# Patient Record
Sex: MALE | Race: WHITE | NOT HISPANIC OR LATINO | Employment: FULL TIME | URBAN - METROPOLITAN AREA
[De-identification: names, ages, dates, MRNs, and addresses within clinical notes are randomized per-mention and may not be internally consistent; named-entity substitution may affect disease eponyms.]

---

## 2018-11-08 ENCOUNTER — TELEPHONE (OUTPATIENT)
Dept: FAMILY MEDICINE | Facility: CLINIC | Age: 58
End: 2018-11-08

## 2018-11-08 NOTE — TELEPHONE ENCOUNTER
11/8/2018    Call Regarding ReattributionPhysical    Attempt 1    Message on voicemail     Comments:       Outreach   BETHEL

## 2019-08-13 ENCOUNTER — OFFICE VISIT (OUTPATIENT)
Dept: FAMILY MEDICINE | Facility: CLINIC | Age: 59
End: 2019-08-13
Payer: COMMERCIAL

## 2019-08-13 VITALS
SYSTOLIC BLOOD PRESSURE: 131 MMHG | HEART RATE: 61 BPM | BODY MASS INDEX: 22.73 KG/M2 | OXYGEN SATURATION: 97 % | DIASTOLIC BLOOD PRESSURE: 87 MMHG | WEIGHT: 150 LBS | TEMPERATURE: 98 F | HEIGHT: 68 IN

## 2019-08-13 DIAGNOSIS — Z00.00 ROUTINE GENERAL MEDICAL EXAMINATION AT A HEALTH CARE FACILITY: Primary | ICD-10-CM

## 2019-08-13 DIAGNOSIS — Z23 NEED FOR VACCINATION: ICD-10-CM

## 2019-08-13 PROCEDURE — 90471 IMMUNIZATION ADMIN: CPT | Performed by: PHYSICIAN ASSISTANT

## 2019-08-13 PROCEDURE — 90715 TDAP VACCINE 7 YRS/> IM: CPT | Performed by: PHYSICIAN ASSISTANT

## 2019-08-13 PROCEDURE — 99386 PREV VISIT NEW AGE 40-64: CPT | Mod: 25 | Performed by: PHYSICIAN ASSISTANT

## 2019-08-13 ASSESSMENT — MIFFLIN-ST. JEOR: SCORE: 1474.9

## 2019-08-13 NOTE — NURSING NOTE
"Chief Complaint   Patient presents with     Physical     /87   Pulse 61   Temp 98  F (36.7  C) (Oral)   Ht 1.727 m (5' 8\")   Wt 68 kg (150 lb)   SpO2 97%   BMI 22.81 kg/m   Estimated body mass index is 22.81 kg/m  as calculated from the following:    Height as of this encounter: 1.727 m (5' 8\").    Weight as of this encounter: 68 kg (150 lb).  Medication Reconciliation: complete      Health Maintenance that is potentially due pending provider review:  NONE    n/a    ALLA Perales  "

## 2019-08-13 NOTE — PROGRESS NOTES
SUBJECTIVE:   CC: Imtiaz Ramos is an 59 year old male who presents for preventative health visit.     Healthy Habits:     Getting at least 3 servings of Calcium per day:  Yes    Bi-annual eye exam:  NO    Dental care twice a year:  Yes    Sleep apnea or symptoms of sleep apnea:  None    Diet:  Other    Frequency of exercise:  4-5 days/week    Duration of exercise:  30-45 minutes    Taking medications regularly:  Not Applicable    Medication side effects:  Not applicable    PHQ-2 Total Score: 0    Additional concerns today:  No      60 y/o male considered NP male here for well exam.  It has been over 3 years since we have seen last.  He overall feels great, has not needed any medical intervention in that time.  He exercises consistently, eats clean diet.  Energy is good, sleeps great.  Work is going well.        Today's PHQ-2 Score:   PHQ-2 ( 1999 [employer]) 8/13/2019   Q1: Little interest or pleasure in doing things 0   Q2: Feeling down, depressed or hopeless 0   PHQ-2 Score 0   Q1: Little interest or pleasure in doing things Not at all   Q2: Feeling down, depressed or hopeless Not at all   PHQ-2 Score 0       Abuse: Current or Past(Physical, Sexual or Emotional)- No  Do you feel safe in your environment? Yes    Social History     Tobacco Use     Smoking status: Never Smoker     Smokeless tobacco: Never Used   Substance Use Topics     Alcohol use: Yes     Comment: occassionally         Alcohol Use 8/13/2019   Prescreen: >3 drinks/day or >7 drinks/week? No   Prescreen: >3 drinks/day or >7 drinks/week? -   No flowsheet data found.    Last PSA: No results found for: PSA    Reviewed orders with patient. Reviewed health maintenance and updated orders accordingly - Yes  BP Readings from Last 3 Encounters:   08/13/19 131/87   07/18/16 136/80   06/26/14 104/66    Wt Readings from Last 3 Encounters:   08/13/19 68 kg (150 lb)   07/18/16 71.7 kg (158 lb)   06/26/14 67.9 kg (149 lb 11.2 oz)                    Reviewed and updated  "as needed this visit by clinical staff  Tobacco  Allergies  Meds         Reviewed and updated as needed this visit by Provider            Review of Systems  CONSTITUTIONAL: NEGATIVE for fever, chills, change in weight  INTEGUMENTARY/SKIN: NEGATIVE for worrisome rashes, moles or lesions  EYES: NEGATIVE for vision changes or irritation  ENT: NEGATIVE for ear, mouth and throat problems  RESP: NEGATIVE for significant cough or SOB  CV: NEGATIVE for chest pain, palpitations or peripheral edema  GI: NEGATIVE for nausea, abdominal pain, heartburn, or change in bowel habits   male: negative for dysuria, hematuria, decreased urinary stream, erectile dysfunction, urethral discharge  MUSCULOSKELETAL: NEGATIVE for significant arthralgias or myalgia  NEURO: NEGATIVE for weakness, dizziness or paresthesias  PSYCHIATRIC: NEGATIVE for changes in mood or affect    OBJECTIVE:   /87   Pulse 61   Temp 98  F (36.7  C) (Oral)   Ht 1.727 m (5' 8\")   Wt 68 kg (150 lb)   SpO2 97%   BMI 22.81 kg/m      Physical Exam  GENERAL: alert and no distress  EYES: Eyes grossly normal to inspection, PERRL and conjunctivae and sclerae normal  HENT: ear canals and TM's normal, nose and mouth without ulcers or lesions  NECK: no adenopathy, no asymmetry, masses, or scars and thyroid normal to palpation  RESP: lungs clear to auscultation - no rales, rhonchi or wheezes  CV: regular rate and rhythm, normal S1 S2, no S3 or S4, no murmur, click or rub, no peripheral edema and peripheral pulses strong  ABDOMEN: soft, nontender, no hepatosplenomegaly, no masses and bowel sounds normal  MS: no gross musculoskeletal defects noted, no edema  SKIN: no suspicious lesions or rashes  NEURO: Normal strength and tone, mentation intact and speech normal  PSYCH: mentation appears normal, affect normal/bright    Diagnostic Test Results:  Labs reviewed in Epic    ASSESSMENT/PLAN:   1. Routine general medical examination at a health care facility  Doing very " "well.  Discussed updating labs, but he did decline today.    2. Need for vaccination  Discussed shingles vaccine, he is going to look into coverage.  - TDAP VACCINE  - ADMIN 1st VACCINE    COUNSELING:   Reviewed preventive health counseling, as reflected in patient instructions       Regular exercise       Healthy diet/nutrition       Immunizations    Vaccinated for: TDAP             Colon cancer screening       Prostate cancer screening    Estimated body mass index is 22.81 kg/m  as calculated from the following:    Height as of this encounter: 1.727 m (5' 8\").    Weight as of this encounter: 68 kg (150 lb).          reports that he has never smoked. He has never used smokeless tobacco.      Counseling Resources:  ATP IV Guidelines  Pooled Cohorts Equation Calculator  FRAX Risk Assessment  ICSI Preventive Guidelines  Dietary Guidelines for Americans, 2010  USDA's MyPlate  ASA Prophylaxis  Lung CA Screening    Charbel Fitzgerald PA-C  [facility]  "

## 2019-09-16 DIAGNOSIS — E78.5 HYPERLIPIDEMIA LDL GOAL <100: Primary | ICD-10-CM

## 2019-09-19 ENCOUNTER — OFFICE VISIT (OUTPATIENT)
Dept: CARDIOLOGY | Facility: CLINIC | Age: 59
End: 2019-09-19
Payer: COMMERCIAL

## 2019-09-19 VITALS
BODY MASS INDEX: 22.55 KG/M2 | DIASTOLIC BLOOD PRESSURE: 81 MMHG | SYSTOLIC BLOOD PRESSURE: 128 MMHG | HEART RATE: 50 BPM | OXYGEN SATURATION: 99 % | WEIGHT: 148.3 LBS

## 2019-09-19 DIAGNOSIS — E78.5 HYPERLIPIDEMIA LDL GOAL <100: ICD-10-CM

## 2019-09-19 DIAGNOSIS — E78.5 HYPERLIPIDEMIA LDL GOAL <160: Primary | ICD-10-CM

## 2019-09-19 LAB
CHOLEST SERPL-MCNC: 199 MG/DL
CREAT UR-MCNC: 175 MG/DL
CRP SERPL HS-MCNC: 0.7 MG/L
FEF 25/75: NORMAL
FEV-1: NORMAL
FEV1/FVC: NORMAL
FVC: NORMAL
GLUCOSE SERPL-MCNC: 84 MG/DL (ref 70–99)
HDLC SERPL-MCNC: 42 MG/DL
LDLC SERPL CALC-MCNC: 136 MG/DL
MICROALBUMIN UR-MCNC: 10 MG/L
MICROALBUMIN/CREAT UR: 5.94 MG/G CR (ref 0–17)
NONHDLC SERPL-MCNC: 157 MG/DL
NT-PROBNP SERPL-MCNC: 33 PG/ML (ref 0–125)
TRIGL SERPL-MCNC: 102 MG/DL

## 2019-09-19 NOTE — LETTER
9/19/2019      RE: Imtiaz Ramos  [id number]  [facility] [id number]       Dear Colleague,    Thank you for the opportunity to participate in the care of your patient, Imtiaz Ramos, at the [facility] FOR CARDIOVASCULAR DISEASE PREVENTION at [facility]. Please see a copy of my visit note below.    [facility] for Cardiovascular Disease Prevention - Exam Note    Active Problems   Patient Active Problem List    Diagnosis Date Noted     Advanced directives, counseling/discussion 07/18/2016     Priority: Medium     Pt will bring copy to clinic 07/18/2016       Hyperlipidemia LDL goal <160 06/05/2012     Priority: Medium       Reason For Visit   Patient here for [facility] early detection of atherosclerosis and CVD exam.    Pain Evaluation  Current history of pain associated with this visit is: denied    HPI   Imtiaz Ramos is a 59 year old year old male with a history of hyperlipidemia.  Hyperlipidemia was identified at age 46.  His highest level of LDL has been 170.  He has been seen in the [facility] CV Prevention Clinic in 2007 and 12/2/13.  His score for the Prevention Test Results Summary in 2013 was 2.    1 point for arterial elasticity and 1 point for the retinal photo.  In 2007 his Prevention Test Results Summary was 1 for borderline retinal photo results.  He has never been on a statin medication for hyperlipidemia.  He denies CP at rest or with exercise, SOB at rest or with activity, lower leg edema, heartburn, HA or calf pain.  He has lost approximately 12 pounds this past year due to change in his exercise routine and loss of muscle mass.  He returns to clinic today for routine follow up.      Nutrition assessment per patient report:   Foods with fat/cholesterol (fried foods, fatty meats, junk food):  <1/day french fries on occasion   Fruits and vegetables (  cup cooked, 1 cup raw):  2-3/day vegetables, fruit 1-2/day  Caffeine (1 cup coffee, soda,  etc):  1 cup of coffee in am  Alcohol servings (12 oz. beer, 4 oz. wine, 1  oz. in mixed drink):  2-3 glasses of wine/week  Calcium servings (dairy foods, 8 oz. milk, yogurt, cheese, ice cream):  cheese 3x/week  Salt/sodium use:  no  Special dietary habits:  no red meat, eats chicken and fish    Activity  Patient is active 5x/wee times per week for 30-90 minutes with running and [employer] arts.  He is planning to run a 5 mile run over Thanksgiving.  .  Last year he worked out in gymnastics and cross fit.      Laboratory Results Review  We discussed laboratory results today including lipids targets and how foods influence cholesterol.    Weight  His perceived healthy weight is 150 pounds.  A normal BMI of 25 is equal to 164 pounds.  The current BMI of 23 is normal weight range.    PMH   Past Medical History:   Diagnosis Date     Cardiovascular risk factor     Gutierrez early CVD score  with 0 all tests normal, 20 all abnormal     Hyperlipidaemia age 46    -170's     Skin cancer, basal cell     resected     White coat hypertension     systolic in the 200's then low in 110's.       PSH  Past Surgical History:   Procedure Laterality Date     COLONOSCOPY  2012    Procedure: COLONOSCOPY;;  Surgeon: Mag Mcdonald MD;  Location:  GI     DENTAL SURGERY      tooth implant     wisdom teeth         Current Meds   Current Outpatient Medications   Medication Sig Dispense Refill     IBUPROFEN 1 tablet as needed About once per week         Allergies      Allergies   Allergen Reactions     No Known Allergies        Family Hx   Family History   Problem Relation Age of Onset     Alcohol/Drug Mother         living age 79     Hypertension Mother 72     Cancer Mother      Hypothyroidism Daughter      Arthritis Maternal Grandmother      Cerebrovascular Disease Maternal Grandmother          age 98     Cancer Maternal Grandfather          age 72-brain tumor     Alzheimer Disease Paternal  Grandmother          age 86     Cerebrovascular Disease Paternal Grandfather          age 76       Social History  Imtiaz is working in a [employer] and is working full time. His job is [employer].  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  He is  with 2 children, a son, age 26 and a daughter, age 22.    Enjoyment of life is 8 with 10 enjoys life fully.    Tobacco History  History   Smoking Status     Never Smoker   Smokeless Tobacco     Never Used       ROS  CONSTITUTIONAL:  No fever, chills, or sweats. No weight gain/loss.   EENT:  No visual disturbance, ear ache, epistaxis, sore throat  ALLERGIES/IMMUNOLOGIC:  Negative  RESPIRATORY:  No cough, hemoptysis  CARDIOVASCULAR:  As per HPI  GI:  No nausea, vomiting, hematemesis, melena  :  No urinary frequency, dysuria, or hematuria  INTEGUMENT:  Negative  PSYCHIATRIC:  Negative  NEURO:  Negative  ENDOCRINE:  Negative  MUSCULOSKELETAL:  Negative     Vital Signs   /81 (BP Location: Left leg, Patient Position: Sitting, Cuff Size: Adult Regular)   Pulse 50   SpO2 99%       Waist: 31 inches  Hip: 37 inches    Physical Exam   In general, the patient is a pleasant male in no apparent distress.  Affect is pleasant, gait is steady.  HEENT: NC/AT.  PERRLA.  EOMI.  Sclerae white, not injected.  Nares clear.  Pharynx without erythema or exudate.  Dentition intact.    Neck: No adenopathy.  No thyromegaly.Carotids +4/4 bilaterally without bruits.  No jugular venous distension.   Lungs: Breath sounds clear asmita, without crackles, wheezing or rhonchi.  Cor: RRR. Normal S1, S2 without S3 or S4.   No murmur, rub, click, or gallop. The PMI is in the 5th ICS in the midclavicular line. No heave appreciated.     Abdomen: BS + in all 4 quadrants.  Soft, nontender, no masses, nondistended. No organomegaly.  No aortic or renal bruits.   Extremities: No clubbing, cyanosis, or edema. The PT and DP pulses are +2/2 at the post-tibial sites bilaterally.      Recent Labs  Lab Results   Component Value Date    GLC 84 09/19/2019      Lab Results   Component Value Date    NTBNP 33 09/19/2019     No results found for: NTBNPI   Lab Results   Component Value Date    UCRR 175 09/19/2019      Lab Results   Component Value Date    MICROL 10 09/19/2019      No results found for: MICROALBUMIN   Lab Results   Component Value Date    CRP  12/02/2013     <0.2  Reference Values:   Low Risk:           <1.0 mg/L   Average Risk:       1.0-3.0 mg/L   High Risk:          >3.0 mg/L   Acute Inflammation: >8.0 mg/L      Lab Results   Component Value Date    CHOL 199 09/19/2019      Lab Results   Component Value Date    TRIG 102 09/19/2019      Lab Results   Component Value Date    HDL 42 09/19/2019      Lab Results   Component Value Date     (H) 09/19/2019      Lab Results   Component Value Date    VLDL 22 06/26/2014      Lab Results   Component Value Date    CHOLHDLRATIO 4.7 06/26/2014     Lab Results   Component Value Date    NHDL 157 (H) 09/19/2019          Gutierrez Test Results    BASIC SPIROMETRY: Summary of two attempts (see printout for details of results)  Results Estimated range for ht/age   FVC: 3.78 liter FVC: 3.62-5.38 liter   FEV1: 3.08 liter FEV1: 2.68-4.17 liter     History of asthma:  NO   History of respiratory infection current/recent:  NO    Spirometry Results:  normal      WALKING BLOOD PRESSURE RESPONSE (3 minute, 5 MET level walk)   Pre BP: 92/70 mmHg  3 min BP: 118/60 mmHg  1 min post BP: 110/72 mmHg    Pre HR: 55 bpm  3 min HR: 99 bpm  1 min post HR: 51 bpm       RETINAL VASCULAR ASSESSMENT   Left Eye Abnormality:  none  AV Ratio: 0.90    Right Eye Abnormality:  none  AV Ratio: 0.95     Retinal Assessment:  normal      ABDOMINAL AORTA ULTRASOUND (< 2.5 normal, borderline 2.5-2.9, abnormal > 3)   SupraIliac 2.02 cm    SupraRenal 1.77 cm    InfraRenal Proximal 1.95 cm    InfraRenal Distal 1.88 cm      Abdominal Aorta Assessment:  normal      LEFT VENTRICULAR  ULTRASOUND MEASUREMENTS (adjusted for BSA)  LVIDD 48.6 mm   Septa 9.1 mm   Posterior 9.7 mm     Left Ventricular US Assessment:  normal      Carotid Artery IMT measurements report and plaques in the small area examined:   Left IMT 0.638 mm  Plaques none    Right IMT 0.597 mm  Plaques none       ECG (see tracing):  normal sinus rhythm;  rate: 43 bpm      Arterial Elasticity per age and gender (see printout):   C1 25.2 mL/mmHg x 10  normal   C2 5.6 mL/mmHg x 100 normal   Supine blood pressure: 116/69 mmHg       Assessment:   1. Cardiovascular:  Asymptomatic today.  Denies any chest pain at rest or with activity. ECG:  HR 43 bpm, NSR    2. Blood Pressure:  Well controlled today.  He does not take any BP medications.    3. Lipids:   /HDL 42//Trig 102  He does not take a statin medication.    2014 /HDL 45//TGrig 111   2012 /HDL 50//Trig 105    4. Glucose: Glucose level is 84     5. Exercise routine:  Very active, he exercises 5 days/week for 30-90 minutes     6. Nutrition.  Eats very healthy diet.  Reviewed healthy eating habits and the Mediterranean diet.     7.  Return to [facility] CV Prevention clinic in ??? years.      Health Habit Summary:  Nutrition: Heart Healthy Eating:  Very healthy diet   Exercise:  regularly active,  5x/week  Weight:  normal weight range  Tobacco Use:  never used    Full report to follow prevention team review of test results with scanned final report.    Time spent for patient visit was 60 minutes with more than half the time spent on counseling and coordination of care.    Rima Oneal APRN CNP       CC  Patient Care Team:  [facility], [facility] as PCP - Charbel Curiel PA-C as Assigned PCP  SELF, REFERRED    [facility] for Cardiovascular Disease Prevention - Exam Note    Active Problems   Patient Active Problem List    Diagnosis Date Noted     Advanced directives, counseling/discussion 07/18/2016     Priority: Medium     Pt  will bring copy to clinic 07/18/2016       Hyperlipidemia LDL goal <160 06/05/2012     Priority: Medium       Reason For Visit   Patient here for [facility] early detection of atherosclerosis and CVD exam.    Pain Evaluation  Current history of pain associated with this visit is: denied    HPI   Imtiaz Ramos is a 59 year old year old male with a history of hyperlipidemia.  Hyperlipidemia was identified at age 46.  His highest level of LDL has been 170.  He has been seen in the [facility] CV Prevention Clinic in 2007 and 12/2/13.  His score for the Prevention Test Results Summary in 2013 was 2.    1 point for arterial elasticity and 1 point for the retinal photo.  In 2007 his Prevention Test Results Summary was 1 for borderline retinal photo results.  He has never been on a statin medication for hyperlipidemia.  He denies CP at rest or with exercise, SOB at rest or with activity, lower leg edema, heartburn, HA or calf pain.  He has lost approximately 12 pounds this past year due to change in his exercise routine and loss of muscle mass.  He returns to clinic today for routine follow up.      Nutrition assessment per patient report:   Foods with fat/cholesterol (fried foods, fatty meats, junk food):  <1/day french fries on occasion   Fruits and vegetables (  cup cooked, 1 cup raw):  2-3/day vegetables, fruit 1-2/day  Caffeine (1 cup coffee, soda, etc):  1 cup of coffee in am  Alcohol servings (12 oz. beer, 4 oz. wine, 1  oz. in mixed drink):  2-3 glasses of wine/week  Calcium servings (dairy foods, 8 oz. milk, yogurt, cheese, ice cream):  cheese 3x/week  Salt/sodium use:  no  Special dietary habits:  no red meat, eats chicken and fish    Activity  Patient is active 5x/wee times per week for 30-90 minutes with running and [employer] arts.  He is planning to run a 5 mile run over [employer]  .  Last year he worked out in gymnastics and cross fit.      Laboratory Results Review  We discussed laboratory results today  including lipids targets and how foods influence cholesterol.    Weight  His perceived healthy weight is 150 pounds.  A normal BMI of 25 is equal to 164 pounds.  The current BMI of 23 is normal weight range.    PMH   Past Medical History:   Diagnosis Date     Cardiovascular risk factor     Gutierrez early CVD score  with 0 all tests normal, 20 all abnormal     Hyperlipidaemia age 46    -170's     Skin cancer, basal cell     resected     White coat hypertension     systolic in the 200's then low in 110's.       PSH  Past Surgical History:   Procedure Laterality Date     COLONOSCOPY  2012    Procedure: COLONOSCOPY;;  Surgeon: Mag Mcdonald MD;  Location:  GI     DENTAL SURGERY      tooth implant     wisdom teeth         Current Meds   Current Outpatient Medications   Medication Sig Dispense Refill     IBUPROFEN 1 tablet as needed About once per week         Allergies      Allergies   Allergen Reactions     No Known Allergies        Family Hx   Family History   Problem Relation Age of Onset     Alcohol/Drug Mother         living age 79     Hypertension Mother 72     Cancer Mother      Hypothyroidism Daughter      Arthritis Maternal Grandmother      Cerebrovascular Disease Maternal Grandmother          age 98     Cancer Maternal Grandfather          age 72-brain tumor     Alzheimer Disease Paternal Grandmother          age 86     Cerebrovascular Disease Paternal Grandfather          age 76       Social History  Imtiaz is working in a [employer] and is working full time. His job is [employer].  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  He is  with 2 children, a son, age 26 and a daughter, age 22.    Enjoyment of life is 8 with 10 enjoys life fully.    Tobacco History  History   Smoking Status     Never Smoker   Smokeless Tobacco     Never Used       ROS  CONSTITUTIONAL:  No fever, chills, or sweats. No weight gain/loss.   EENT:  No visual  disturbance, ear ache, epistaxis, sore throat  ALLERGIES/IMMUNOLOGIC:  Negative  RESPIRATORY:  No cough, hemoptysis  CARDIOVASCULAR:  As per HPI  GI:  No nausea, vomiting, hematemesis, melena  :  No urinary frequency, dysuria, or hematuria  INTEGUMENT:  Negative  PSYCHIATRIC:  Negative  NEURO:  Negative  ENDOCRINE:  Negative  MUSCULOSKELETAL:  Negative     Vital Signs   /81 (BP Location: Left leg, Patient Position: Sitting, Cuff Size: Adult Regular)   Pulse 50   SpO2 99%       Waist: 31 inches  Hip: 37 inches    Physical Exam   In general, the patient is a pleasant male in no apparent distress.  Affect is pleasant, gait is steady.  HEENT: NC/AT.  PERRLA.  EOMI.  Sclerae white, not injected.  Nares clear.  Pharynx without erythema or exudate.  Dentition intact.    Neck: No adenopathy.  No thyromegaly.Carotids +4/4 bilaterally without bruits.  No jugular venous distension.   Lungs: Breath sounds clear asmita, without crackles, wheezing or rhonchi.  Cor: RRR. Normal S1, S2 without S3 or S4.   No murmur, rub, click, or gallop. The PMI is in the 5th ICS in the midclavicular line. No heave appreciated.     Abdomen: BS + in all 4 quadrants.  Soft, nontender, no masses, nondistended. No organomegaly.  No aortic or renal bruits.   Extremities: No clubbing, cyanosis, or edema. The PT and DP pulses are +2/2 at the post-tibial sites bilaterally.     Recent Labs  Lab Results   Component Value Date    GLC 84 09/19/2019      Lab Results   Component Value Date    NTBNP 33 09/19/2019     No results found for: NTBNPI   Lab Results   Component Value Date    UCRR 175 09/19/2019      Lab Results   Component Value Date    MICROL 10 09/19/2019      No results found for: MICROALBUMIN   Lab Results   Component Value Date    CRP  12/02/2013     <0.2  Reference Values:   Low Risk:           <1.0 mg/L   Average Risk:       1.0-3.0 mg/L   High Risk:          >3.0 mg/L   Acute Inflammation: >8.0 mg/L      Lab Results   Component Value  Date    CHOL 199 09/19/2019      Lab Results   Component Value Date    TRIG 102 09/19/2019      Lab Results   Component Value Date    HDL 42 09/19/2019      Lab Results   Component Value Date     (H) 09/19/2019      Lab Results   Component Value Date    VLDL 22 06/26/2014      Lab Results   Component Value Date    CHOLHDLRATIO 4.7 06/26/2014     Lab Results   Component Value Date    NHDL 157 (H) 09/19/2019          Gutierrez Test Results    BASIC SPIROMETRY: Summary of two attempts (see printout for details of results)  Results Estimated range for ht/age   FVC: 3.78 liter FVC: 3.62-5.38 liter   FEV1: 3.08 liter FEV1: 2.68-4.17 liter     History of asthma:  NO   History of respiratory infection current/recent:  NO    Spirometry Results:  normal      WALKING BLOOD PRESSURE RESPONSE (3 minute, 5 MET level walk)   Pre BP: 92/70 mmHg  3 min BP: 118/60 mmHg  1 min post BP: 110/72 mmHg    Pre HR: 55 bpm  3 min HR: 99 bpm  1 min post HR: 51 bpm       RETINAL VASCULAR ASSESSMENT   Left Eye Abnormality:  none  AV Ratio: 0.90    Right Eye Abnormality:  none  AV Ratio: 0.95     Retinal Assessment:  normal      ABDOMINAL AORTA ULTRASOUND (< 2.5 normal, borderline 2.5-2.9, abnormal > 3)   SupraIliac 2.02 cm    SupraRenal 1.77 cm    InfraRenal Proximal 1.95 cm    InfraRenal Distal 1.88 cm      Abdominal Aorta Assessment:  normal      LEFT VENTRICULAR ULTRASOUND MEASUREMENTS (adjusted for BSA)  LVIDD 48.6 mm   Septa 9.1 mm   Posterior 9.7 mm     Left Ventricular US Assessment:  normal      Carotid Artery IMT measurements report and plaques in the small area examined:   Left IMT 0.638 mm  Plaques none    Right IMT 0.597 mm  Plaques none       ECG (see tracing):  normal sinus rhythm;  rate: 43 bpm      Arterial Elasticity per age and gender (see printout):   C1 25.2 mL/mmHg x 10  normal   C2 5.6 mL/mmHg x 100 normal   Supine blood pressure: 116/69 mmHg       Assessment:   1. Cardiovascular:  Asymptomatic today.  Denies any  chest pain at rest or with activity. ECG:  HR 43 bpm, NSR.  He test results are similar to the test results from 2013.      2. Blood Pressure:  Well controlled today.  He does not take any BP medications.    3. Lipids:   /HDL 42//Trig 102  He does not take a statin medication.    2014 /HDL 45//TGrig 111   2012 /HDL 50//Trig 105    4. Glucose: Glucose level is 84     5. Exercise routine:  Very active, he exercises 5 days/week for 30-90 minutes     6. Nutrition.  Eats very healthy diet.  Reviewed healthy eating habits and the Mediterranean diet.     7.  Return to [facility] CV Prevention clinic in 3-5 years.      Health Habit Summary:  Nutrition: Heart Healthy Eating:  Very healthy diet   Exercise:  regularly active,  5x/week  Weight:  normal weight range  Tobacco Use:  never used    Full report to follow prevention team review of test results with scanned final report.    Time spent for patient visit was 60 minutes with more than half the time spent on counseling and coordination of care.    PHUC Kulkarni CNP       CC  Patient Care Team:  [facility] as PCP - Charbel Curiel PA-C as Assigned PCP  SELF, REFERRED

## 2019-09-19 NOTE — PROGRESS NOTES
[facility] Prevention - Exam Note    Active Problems   Patient Active Problem List    Diagnosis Date Noted     Advanced directives, counseling/discussion 07/18/2016     Priority: Medium     Pt will bring copy to clinic 07/18/2016       Hyperlipidemia LDL goal <160 06/05/2012     Priority: Medium       Reason For Visit   Patient here for [facility] early detection of atherosclerosis and CVD exam.    Pain Evaluation  Current history of pain associated with this visit is: denied    HPI   Imtiaz Ramos is a 59 year old year old male with a history of hyperlipidemia.  Hyperlipidemia was identified at age 46.  His highest level of LDL has been 170.  He has been seen in the [facility] Prevention Clinic in 2007 and 12/2/13.  His score for the Prevention Test Results Summary in 2013 was 2.    1 point for arterial elasticity and 1 point for the retinal photo.  In 2007 his Prevention Test Results Summary was 1 for borderline retinal photo results.  He has never been on a statin medication for hyperlipidemia.  He denies CP at rest or with exercise, SOB at rest or with activity, lower leg edema, heartburn, HA or calf pain.  He has lost approximately 12 pounds this past year due to change in his exercise routine and loss of muscle mass.  He returns to clinic today for routine follow up.      Nutrition assessment per patient report:   Foods with fat/cholesterol (fried foods, fatty meats, junk food):  <1/day french fries on occasion   Fruits and vegetables (  cup cooked, 1 cup raw):  2-3/day vegetables, fruit 1-2/day  Caffeine (1 cup coffee, soda, etc):  1 cup of coffee in am  Alcohol servings (12 oz. beer, 4 oz. wine, 1  oz. in mixed drink):  2-3 glasses of wine/week  Calcium servings (dairy foods, 8 oz. milk, yogurt, cheese, ice cream):  cheese 3x/week  Salt/sodium use:  no  Special dietary habits:  no red meat, eats chicken and fish    Activity  Patient is active 5x/wee times per week for 30-90  minutes with running and [employer] arts.  He is planning to run a 5 mile run over [employer]  .  Last year he worked out in gymnastics and cross fit.      Laboratory Results Review  We discussed laboratory results today including lipids targets and how foods influence cholesterol.    Weight  His perceived healthy weight is 150 pounds.  A normal BMI of 25 is equal to 164 pounds.  The current BMI of 23 is normal weight range.    PMH   Past Medical History:   Diagnosis Date     Cardiovascular risk factor     Gutierrez early CVD score  with 0 all tests normal, 20 all abnormal     Hyperlipidaemia age 46    -170's     Skin cancer, basal cell     resected     White coat hypertension     systolic in the 200's then low in 110's.       PSH  Past Surgical History:   Procedure Laterality Date     COLONOSCOPY  2012    Procedure: COLONOSCOPY;;  Surgeon: Mag Mcdonald MD;  Location:  GI     DENTAL SURGERY      tooth implant     wisdom teeth         Current Meds   Current Outpatient Medications   Medication Sig Dispense Refill     IBUPROFEN 1 tablet as needed About once per week         Allergies      Allergies   Allergen Reactions     No Known Allergies        Family Hx   Family History   Problem Relation Age of Onset     Alcohol/Drug Mother         living age 79     Hypertension Mother 72     Cancer Mother      Hypothyroidism Daughter      Arthritis Maternal Grandmother      Cerebrovascular Disease Maternal Grandmother          age 98     Cancer Maternal Grandfather          age 72-brain tumor     Alzheimer Disease Paternal Grandmother          age 86     Cerebrovascular Disease Paternal Grandfather          age 76       Social History  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 His job is [email or web address].  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  He is  with 2 children, a son, age 26 and a daughter, age 22.    Enjoyment of life is 8 with  10 enjoys life fully.    Tobacco History  History   Smoking Status     Never Smoker   Smokeless Tobacco     Never Used       ROS  CONSTITUTIONAL:  No fever, chills, or sweats. No weight gain/loss.   EENT:  No visual disturbance, ear ache, epistaxis, sore throat  ALLERGIES/IMMUNOLOGIC:  Negative  RESPIRATORY:  No cough, hemoptysis  CARDIOVASCULAR:  As per HPI  GI:  No nausea, vomiting, hematemesis, melena  :  No urinary frequency, dysuria, or hematuria  INTEGUMENT:  Negative  PSYCHIATRIC:  Negative  NEURO:  Negative  ENDOCRINE:  Negative  MUSCULOSKELETAL:  Negative     Vital Signs   /81 (BP Location: Left leg, Patient Position: Sitting, Cuff Size: Adult Regular)   Pulse 50   SpO2 99%       Waist: 31 inches  Hip: 37 inches    Physical Exam   In general, the patient is a pleasant male in no apparent distress.  Affect is pleasant, gait is steady.  HEENT: NC/AT.  PERRLA.  EOMI.  Sclerae white, not injected.  Nares clear.  Pharynx without erythema or exudate.  Dentition intact.    Neck: No adenopathy.  No thyromegaly.Carotids +4/4 bilaterally without bruits.  No jugular venous distension.   Lungs: Breath sounds clear asmita, without crackles, wheezing or rhonchi.  Cor: RRR. Normal S1, S2 without S3 or S4.   No murmur, rub, click, or gallop. The PMI is in the 5th ICS in the midclavicular line. No heave appreciated.     Abdomen: BS + in all 4 quadrants.  Soft, nontender, no masses, nondistended. No organomegaly.  No aortic or renal bruits.   Extremities: No clubbing, cyanosis, or edema. The PT and DP pulses are +2/2 at the post-tibial sites bilaterally.     Recent Labs  Lab Results   Component Value Date    GLC 84 09/19/2019      Lab Results   Component Value Date    NTBNP 33 09/19/2019     No results found for: NTBNPI   Lab Results   Component Value Date    UCRR 175 09/19/2019      Lab Results   Component Value Date    MICROL 10 09/19/2019      No results found for: MICROALBUMIN   Lab Results   Component Value Date     CRP  12/02/2013     <0.2  Reference Values:   Low Risk:           <1.0 mg/L   Average Risk:       1.0-3.0 mg/L   High Risk:          >3.0 mg/L   Acute Inflammation: >8.0 mg/L      Lab Results   Component Value Date    CHOL 199 09/19/2019      Lab Results   Component Value Date    TRIG 102 09/19/2019      Lab Results   Component Value Date    HDL 42 09/19/2019      Lab Results   Component Value Date     (H) 09/19/2019      Lab Results   Component Value Date    VLDL 22 06/26/2014      Lab Results   Component Value Date    CHOLHDLRATIO 4.7 06/26/2014     Lab Results   Component Value Date    NHDL 157 (H) 09/19/2019          Gutierrez Test Results    BASIC SPIROMETRY: Summary of two attempts (see printout for details of results)  Results Estimated range for ht/age   FVC: 3.78 liter FVC: 3.62-5.38 liter   FEV1: 3.08 liter FEV1: 2.68-4.17 liter     History of asthma:  NO   History of respiratory infection current/recent:  NO    Spirometry Results:  normal      WALKING BLOOD PRESSURE RESPONSE (3 minute, 5 MET level walk)   Pre BP: 92/70 mmHg  3 min BP: 118/60 mmHg  1 min post BP: 110/72 mmHg    Pre HR: 55 bpm  3 min HR: 99 bpm  1 min post HR: 51 bpm       RETINAL VASCULAR ASSESSMENT   Left Eye Abnormality:  none  AV Ratio: 0.90    Right Eye Abnormality:  none  AV Ratio: 0.95     Retinal Assessment:  normal      ABDOMINAL AORTA ULTRASOUND (< 2.5 normal, borderline 2.5-2.9, abnormal > 3)   SupraIliac 2.02 cm    SupraRenal 1.77 cm    InfraRenal Proximal 1.95 cm    InfraRenal Distal 1.88 cm      Abdominal Aorta Assessment:  normal      LEFT VENTRICULAR ULTRASOUND MEASUREMENTS (adjusted for BSA)  LVIDD 48.6 mm   Septa 9.1 mm   Posterior 9.7 mm     Left Ventricular US Assessment:  normal      Carotid Artery IMT measurements report and plaques in the small area examined:   Left IMT 0.638 mm  Plaques none    Right IMT 0.597 mm  Plaques none       ECG (see tracing):  normal sinus rhythm;  rate: 43 bpm      Arterial  Elasticity per age and gender (see printout):   C1 25.2 mL/mmHg x 10  normal   C2 5.6 mL/mmHg x 100 normal   Supine blood pressure: 116/69 mmHg       Assessment:   1. Cardiovascular:  Asymptomatic today.  Denies any chest pain at rest or with activity. ECG:  HR 43 bpm, NSR    2. Blood Pressure:  Well controlled today.  He does not take any BP medications.    3. Lipids:   /HDL 42//Trig 102  He does not take a statin medication.    2014 /HDL 45//TGrig 111   2012 /HDL 50//Trig 105    4. Glucose: Glucose level is 84     5. Exercise routine:  Very active, he exercises 5 days/week for 30-90 minutes     6. Nutrition.  Eats very healthy diet.  Reviewed healthy eating habits and the Mediterranean diet.     7.  Return to [facility] CV Prevention clinic in ??? years.      Health Habit Summary:  Nutrition: Heart Healthy Eating:  Very healthy diet   Exercise:  regularly active,  5x/week  Weight:  normal weight range  Tobacco Use:  never used    Full report to follow prevention team review of test results with scanned final report.    Time spent for patient visit was 60 minutes with more than half the time spent on counseling and coordination of care.    PHUC Kulkarni CNP       CC  Patient Care Team:  [facility] as PCP - Charbel Curiel PA-C as Assigned PCP  SELF, REFERRED

## 2019-09-23 LAB — INTERPRETATION ECG - MUSE: NORMAL

## 2019-09-23 NOTE — PROGRESS NOTES
[facility] Center for Cardiovascular Disease Prevention - Exam Note    Active Problems   Patient Active Problem List    Diagnosis Date Noted     Advanced directives, counseling/discussion 07/18/2016     Priority: Medium     Pt will bring copy to clinic 07/18/2016       Hyperlipidemia LDL goal <160 06/05/2012     Priority: Medium       Reason For Visit   Patient here for [facility] early detection of atherosclerosis and CVD exam.    Pain Evaluation  Current history of pain associated with this visit is: denied    HPI   Imtiaz Ramos is a 59 year old year old male with a history of hyperlipidemia.  Hyperlipidemia was identified at age 46.  His highest level of LDL has been 170.  He has been seen in the [facility] CV Prevention Clinic in 2007 and 12/2/13.  His score for the Prevention Test Results Summary in 2013 was 2.    1 point for arterial elasticity and 1 point for the retinal photo.  In 2007 his Prevention Test Results Summary was 1 for borderline retinal photo results.  He has never been on a statin medication for hyperlipidemia.  He denies CP at rest or with exercise, SOB at rest or with activity, lower leg edema, heartburn, HA or calf pain.  He has lost approximately 12 pounds this past year due to change in his exercise routine and loss of muscle mass.  He returns to clinic today for routine follow up.      Nutrition assessment per patient report:   Foods with fat/cholesterol (fried foods, fatty meats, junk food):  <1/day french fries on occasion   Fruits and vegetables (  cup cooked, 1 cup raw):  2-3/day vegetables, fruit 1-2/day  Caffeine (1 cup coffee, soda, etc):  1 cup of coffee in am  Alcohol servings (12 oz. beer, 4 oz. wine, 1  oz. in mixed drink):  2-3 glasses of wine/week  Calcium servings (dairy foods, 8 oz. milk, yogurt, cheese, ice cream):  cheese 3x/week  Salt/sodium use:  no  Special dietary habits:  no red meat, eats chicken and fish    Activity  Patient is active 5x/wee times per week for 30-90  minutes with running and [employer] arts.  He is planning to run a 5 mile run over [employer]  .  Last year he worked out in gymnastics and cross fit.      Laboratory Results Review  We discussed laboratory results today including lipids targets and how foods influence cholesterol.    Weight  His perceived healthy weight is 150 pounds.  A normal BMI of 25 is equal to 164 pounds.  The current BMI of 23 is normal weight range.    PMH   Past Medical History:   Diagnosis Date     Cardiovascular risk factor     Gutierrez early CVD score  with 0 all tests normal, 20 all abnormal     Hyperlipidaemia age 46    -170's     Skin cancer, basal cell     resected     White coat hypertension     systolic in the 200's then low in 110's.       PSH  Past Surgical History:   Procedure Laterality Date     COLONOSCOPY  2012    Procedure: COLONOSCOPY;;  Surgeon: Mag Mcdonald MD;  Location:  GI     DENTAL SURGERY      tooth implant     wisdom teeth         Current Meds   Current Outpatient Medications   Medication Sig Dispense Refill     IBUPROFEN 1 tablet as needed About once per week         Allergies      Allergies   Allergen Reactions     No Known Allergies        Family Hx   Family History   Problem Relation Age of Onset     Alcohol/Drug Mother         living age 79     Hypertension Mother 72     Cancer Mother      Hypothyroidism Daughter      Arthritis Maternal Grandmother      Cerebrovascular Disease Maternal Grandmother          age 98     Cancer Maternal Grandfather          age 72-brain tumor     Alzheimer Disease Paternal Grandmother          age 86     Cerebrovascular Disease Paternal Grandfather          age 76       Social History  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  He is  with 2 children, a son, age 26 and a daughter, age 22.    Enjoyment of life is 8 with  10 enjoys life fully.    Tobacco History  History   Smoking Status     Never Smoker   Smokeless Tobacco     Never Used       ROS  CONSTITUTIONAL:  No fever, chills, or sweats. No weight gain/loss.   EENT:  No visual disturbance, ear ache, epistaxis, sore throat  ALLERGIES/IMMUNOLOGIC:  Negative  RESPIRATORY:  No cough, hemoptysis  CARDIOVASCULAR:  As per HPI  GI:  No nausea, vomiting, hematemesis, melena  :  No urinary frequency, dysuria, or hematuria  INTEGUMENT:  Negative  PSYCHIATRIC:  Negative  NEURO:  Negative  ENDOCRINE:  Negative  MUSCULOSKELETAL:  Negative     Vital Signs   /81 (BP Location: Left leg, Patient Position: Sitting, Cuff Size: Adult Regular)   Pulse 50   SpO2 99%       Waist: 31 inches  Hip: 37 inches    Physical Exam   In general, the patient is a pleasant male in no apparent distress.  Affect is pleasant, gait is steady.  HEENT: NC/AT.  PERRLA.  EOMI.  Sclerae white, not injected.  Nares clear.  Pharynx without erythema or exudate.  Dentition intact.    Neck: No adenopathy.  No thyromegaly.Carotids +4/4 bilaterally without bruits.  No jugular venous distension.   Lungs: Breath sounds clear asmita, without crackles, wheezing or rhonchi.  Cor: RRR. Normal S1, S2 without S3 or S4.   No murmur, rub, click, or gallop. The PMI is in the 5th ICS in the midclavicular line. No heave appreciated.     Abdomen: BS + in all 4 quadrants.  Soft, nontender, no masses, nondistended. No organomegaly.  No aortic or renal bruits.   Extremities: No clubbing, cyanosis, or edema. The PT and DP pulses are +2/2 at the post-tibial sites bilaterally.     Recent Labs  Lab Results   Component Value Date    GLC 84 09/19/2019      Lab Results   Component Value Date    NTBNP 33 09/19/2019     No results found for: NTBNPI   Lab Results   Component Value Date    UCRR 175 09/19/2019      Lab Results   Component Value Date    MICROL 10 09/19/2019      No results found for: MICROALBUMIN   Lab Results   Component Value Date     CRP  12/02/2013     <0.2  Reference Values:   Low Risk:           <1.0 mg/L   Average Risk:       1.0-3.0 mg/L   High Risk:          >3.0 mg/L   Acute Inflammation: >8.0 mg/L      Lab Results   Component Value Date    CHOL 199 09/19/2019      Lab Results   Component Value Date    TRIG 102 09/19/2019      Lab Results   Component Value Date    HDL 42 09/19/2019      Lab Results   Component Value Date     (H) 09/19/2019      Lab Results   Component Value Date    VLDL 22 06/26/2014      Lab Results   Component Value Date    CHOLHDLRATIO 4.7 06/26/2014     Lab Results   Component Value Date    NHDL 157 (H) 09/19/2019          Gutierrez Test Results    BASIC SPIROMETRY: Summary of two attempts (see printout for details of results)  Results Estimated range for ht/age   FVC: 3.78 liter FVC: 3.62-5.38 liter   FEV1: 3.08 liter FEV1: 2.68-4.17 liter     History of asthma:  NO   History of respiratory infection current/recent:  NO    Spirometry Results:  normal      WALKING BLOOD PRESSURE RESPONSE (3 minute, 5 MET level walk)   Pre BP: 92/70 mmHg  3 min BP: 118/60 mmHg  1 min post BP: 110/72 mmHg    Pre HR: 55 bpm  3 min HR: 99 bpm  1 min post HR: 51 bpm       RETINAL VASCULAR ASSESSMENT   Left Eye Abnormality:  none  AV Ratio: 0.90    Right Eye Abnormality:  none  AV Ratio: 0.95     Retinal Assessment:  normal      ABDOMINAL AORTA ULTRASOUND (< 2.5 normal, borderline 2.5-2.9, abnormal > 3)   SupraIliac 2.02 cm    SupraRenal 1.77 cm    InfraRenal Proximal 1.95 cm    InfraRenal Distal 1.88 cm      Abdominal Aorta Assessment:  normal      LEFT VENTRICULAR ULTRASOUND MEASUREMENTS (adjusted for BSA)  LVIDD 48.6 mm   Septa 9.1 mm   Posterior 9.7 mm     Left Ventricular US Assessment:  normal      Carotid Artery IMT measurements report and plaques in the small area examined:   Left IMT 0.638 mm  Plaques none    Right IMT 0.597 mm  Plaques none       ECG (see tracing):  normal sinus rhythm;  rate: 43 bpm      Arterial  Elasticity per age and gender (see printout):   C1 25.2 mL/mmHg x 10  normal   C2 5.6 mL/mmHg x 100 normal   Supine blood pressure: 116/69 mmHg       Assessment:   1. Cardiovascular:  Asymptomatic today.  Denies any chest pain at rest or with activity. ECG:  HR 43 bpm, NSR.  He test results are similar to the test results from 2013.      2. Blood Pressure:  Well controlled today.  He does not take any BP medications.    3. Lipids:   /HDL 42//Trig 102  He does not take a statin medication.    2014 /HDL 45//TGrig 111   2012 /HDL 50//Trig 105    4. Glucose: Glucose level is 84     5. Exercise routine:  Very active, he exercises 5 days/week for 30-90 minutes     6. Nutrition.  Eats very healthy diet.  Reviewed healthy eating habits and the Mediterranean diet.     7.  Return to [facility] CV Prevention clinic in 3-5 years.      Health Habit Summary:  Nutrition: Heart Healthy Eating:  Very healthy diet   Exercise:  regularly active,  5x/week  Weight:  normal weight range  Tobacco Use:  never used    Full report to follow prevention team review of test results with scanned final report.    Time spent for patient visit was 60 minutes with more than half the time spent on counseling and coordination of care.    PHUC Kulkarni CNP       CC  Patient Care Team:  [facility] as PCP - Charbel Curiel PA-C as Assigned PCP  SELF, REFERRED

## 2020-06-23 ENCOUNTER — TELEPHONE (OUTPATIENT)
Dept: FAMILY MEDICINE | Facility: CLINIC | Age: 60
End: 2020-06-23

## 2020-06-23 NOTE — TELEPHONE ENCOUNTER
Reason for Call:  Other appointment    Detailed comments: Pt is wanting an appt for growth on neck.    Phone Number Patient can be reached at: Home number on file [phone] (home)    Best Time: anytime    Can we leave a detailed message on this number? YES    Call taken on 6/23/2020 at 9:06 AM by Rina Kaiser

## 2020-06-24 NOTE — PROGRESS NOTES
"[facility] ([facility] - PRIMARY CARE SKIN        Imtiaz Ramos is a 60 year old male who is being evaluated via a billable video visit.      The patient has been notified of following:     \"This video visit will be conducted via a call between you and your physician/provider. We have found that certain health care needs can be provided without the need for an in-person physical exam.  This service lets us provide the care you need with a video conversation.  If a prescription is necessary we can send it directly to your pharmacy.  If lab work is needed we can place an order for that and you can then stop by our lab to have the test done at a later time.    Video visits are billed at different rates depending on your insurance coverage.  Please reach out to your insurance provider with any questions.    If during the course of the call the physician/provider feels a video visit is not appropriate, you will not be charged for this service.\"    Patient has given verbal consent for Video visit? Yes    How would you like to obtain your AVS? [employer]    Patient would like the video invitation sent by: Send to e-mail at: [phone]      CC: Lesion(s)  SUBJECTIVE:   Imtiaz Ramos is a(n) 60 year old male who presents for virtual visit because of the COVID-19 pandemic because of concerns regarding lesion on the base of the lesion.  Noticed this spot months . He has a similar appearing spot at another location that ended up being a basal cell carcinoma . Lesion does not bleed nor is tender.    I    Personal Medical History  Skin cancer: history of basal cell carcinoma   Eczema Psoriasis Lupus   NO NO NO   Other:   .    Family Medical History  Skin cancer: NO  Eczema Psoriasis Lupus   NO NO NO   Other:   .      Occupation: indoor ().    Refer to electronic medical record (EMR) for past medical history and medications.      ROS: 14 point review of systems was negative except the symptoms listed above in the HPI.        OBJECTIVE: "   GENERAL: healthy, alert and no distress.  HEENT: PERRL. Conjunctiva, sclera clear.  SKIN: Romero Skin Type - II.  Neck examined. The dermatoscope was used to help evaluate pigmented lesions.  Skin Pertinent Findings:  Digital photos - erythematous, semitranslucent lesion ? Capillary hemangioma ? Other. I need to evaluate this in the office to be sure this is not a basal cell carcinoma .    ASSESSMENT:     Encounter Diagnosis   Name Primary?     Neoplasm of uncertain behavior of skin Yes         PLAN:   Patient Instructions   Office visit to clarify the etiology of the lesion.      TT: 7 minutes.

## 2020-06-25 ENCOUNTER — VIRTUAL VISIT (OUTPATIENT)
Dept: FAMILY MEDICINE | Facility: CLINIC | Age: 60
End: 2020-06-25
Payer: COMMERCIAL

## 2020-06-25 DIAGNOSIS — D48.5 NEOPLASM OF UNCERTAIN BEHAVIOR OF SKIN: Primary | ICD-10-CM

## 2020-06-25 PROCEDURE — 99213 OFFICE O/P EST LOW 20 MIN: CPT | Mod: TEL | Performed by: FAMILY MEDICINE

## 2020-06-25 NOTE — LETTER
"    6/25/2020         RE: Imtiaz Ramos  [address]  [place] [address]        Dear Colleague,    Thank you for referring your patient, Imtiaz Ramos, to the [facility]. Please see a copy of my visit note below.    [facility] - PRIMARY CARE SKIN        Imtiaz Ramos is a 60 year old male who is being evaluated via a billable video visit.      The patient has been notified of following:     \"This video visit will be conducted via a call between you and your physician/provider. We have found that certain health care needs can be provided without the need for an in-person physical exam.  This service lets us provide the care you need with a video conversation.  If a prescription is necessary we can send it directly to your pharmacy.  If lab work is needed we can place an order for that and you can then stop by our lab to have the test done at a later time.    Video visits are billed at different rates depending on your insurance coverage.  Please reach out to your insurance provider with any questions.    If during the course of the call the physician/provider feels a video visit is not appropriate, you will not be charged for this service.\"    Patient has given verbal consent for Video visit? Yes    How would you like to obtain your AVS? [facility]    Patient would like the video invitation sent by: Send to e-mail at: [phone]      CC: Lesion(s)  SUBJECTIVE:   Imtiaz Ramos is a(n) 60 year old male who presents for virtual visit because of the COVID-19 pandemic because of concerns regarding lesion on the base of the lesion.  Noticed this spot months . He has a similar appearing spot at another location that ended up being a basal cell carcinoma . Lesion does not bleed nor is tender.    I    Personal Medical History  Skin cancer: history of basal cell carcinoma   Eczema Psoriasis Lupus   NO NO NO   Other:   .    Family Medical History  Skin cancer: NO  Eczema Psoriasis Lupus   NO " NO NO   Other:   .      Occupation: indoor ().    Refer to electronic medical record (EMR) for past medical history and medications.      ROS: 14 point review of systems was negative except the symptoms listed above in the HPI.        OBJECTIVE:   GENERAL: healthy, alert and no distress.  HEENT: PERRL. Conjunctiva, sclera clear.  SKIN: Romero Skin Type - II.  Neck examined. The dermatoscope was used to help evaluate pigmented lesions.  Skin Pertinent Findings:  Digital photos - erythematous, semitranslucent lesion ? Capillary hemangioma ? Other. I need to evaluate this in the office to be sure this is not a basal cell carcinoma .    ASSESSMENT:     Encounter Diagnosis   Name Primary?     Neoplasm of uncertain behavior of skin Yes         PLAN:   Patient Instructions   Office visit to clarify the etiology of the lesion.      TT: 7 minutes.          Again, thank you for allowing me to participate in the care of your patient.        Sincerely,        Rina Toro MD

## 2021-01-15 ENCOUNTER — HEALTH MAINTENANCE LETTER (OUTPATIENT)
Age: 61
End: 2021-01-15

## 2021-05-05 NOTE — PROGRESS NOTES
"    Assessment & Plan     LUQ abdominal pain  Will get further evaluation, differential is large due inconsistent nature, but deserves some work up due to duration.  - UA with Microscopic reflex to Culture  - CBC with platelets differential  - Comprehensive metabolic panel  - CT Abdomen w/o & w Contrast; Future    Left flank pain    - UA with Microscopic reflex to Culture  - CBC with platelets differential  - Comprehensive metabolic panel  - CT Abdomen w/o & w Contrast; Future                 No follow-ups on file.    [facility]   Imtiaz is a 60 year old who presents for the following health issues     HPI     Abdominal/Flank Pain  Onset/Duration: started in November  Description:   Character: Burning  Location: left lower quadrant  Radiation:  Back and front  Intensity: mild  Progression of Symptoms:  worsening  Accompanying Signs & Symptoms:  Fever/Chills: no  Gas/Bloating: no  Nausea: no  Vomitting: no  Diarrhea: no  Constipation: no  Dysuria or Hematuria: no  History:   Trauma: YES- possibly, he feels that it may be from working out  Previous similar pain: no  Previous tests done: none  Precipitating factors:   Does the pain change with:     Food: no    Bowel Movement: no    Urination: no   Other factors:  no  Therapies tried and outcome: None          Review of Systems   Constitutional, HEENT, cardiovascular, pulmonary, gi and gu systems are negative, except as otherwise noted.      Objective    /78   Resp 16   Ht 1.727 m (5' 8\")   Wt 67.8 kg (149 lb 6.4 oz)   BMI 22.72 kg/m    Body mass index is 22.72 kg/m .  Physical Exam   GENERAL: alert and no distress  EYES: Eyes grossly normal to inspection  NECK: no adenopathy, no asymmetry, masses, or scars and thyroid normal to palpation  RESP: lungs clear to auscultation - no rales, rhonchi or wheezes  CV: regular rate and rhythm, normal S1 S2, no S3 or S4, no murmur, click or rub, no peripheral " edema and peripheral pulses strong  ABDOMEN: soft, nontender, no hepatosplenomegaly, no masses and bowel sounds normal  PSYCH: mentation appears normal, affect normal/bright

## 2021-05-07 ENCOUNTER — OFFICE VISIT (OUTPATIENT)
Dept: FAMILY MEDICINE | Facility: CLINIC | Age: 61
End: 2021-05-07
Payer: COMMERCIAL

## 2021-05-07 VITALS
WEIGHT: 149.4 LBS | DIASTOLIC BLOOD PRESSURE: 78 MMHG | BODY MASS INDEX: 22.64 KG/M2 | HEIGHT: 68 IN | SYSTOLIC BLOOD PRESSURE: 128 MMHG | RESPIRATION RATE: 16 BRPM

## 2021-05-07 DIAGNOSIS — R10.9 LEFT FLANK PAIN: ICD-10-CM

## 2021-05-07 DIAGNOSIS — R10.12 LUQ ABDOMINAL PAIN: Primary | ICD-10-CM

## 2021-05-07 LAB
ALBUMIN SERPL-MCNC: 4.3 G/DL (ref 3.4–5)
ALBUMIN UR-MCNC: NEGATIVE MG/DL
ALP SERPL-CCNC: 59 U/L (ref 40–150)
ALT SERPL W P-5'-P-CCNC: 24 U/L (ref 0–70)
ANION GAP SERPL CALCULATED.3IONS-SCNC: 5 MMOL/L (ref 3–14)
APPEARANCE UR: CLEAR
AST SERPL W P-5'-P-CCNC: 22 U/L (ref 0–45)
BASOPHILS # BLD AUTO: 0 10E9/L (ref 0–0.2)
BASOPHILS NFR BLD AUTO: 0.2 %
BILIRUB SERPL-MCNC: 0.8 MG/DL (ref 0.2–1.3)
BILIRUB UR QL STRIP: NEGATIVE
BUN SERPL-MCNC: 15 MG/DL (ref 7–30)
CALCIUM SERPL-MCNC: 9.4 MG/DL (ref 8.5–10.1)
CHLORIDE SERPL-SCNC: 103 MMOL/L (ref 94–109)
CO2 SERPL-SCNC: 30 MMOL/L (ref 20–32)
COLOR UR AUTO: YELLOW
CREAT SERPL-MCNC: 0.93 MG/DL (ref 0.66–1.25)
DIFFERENTIAL METHOD BLD: NORMAL
EOSINOPHIL # BLD AUTO: 0.2 10E9/L (ref 0–0.7)
EOSINOPHIL NFR BLD AUTO: 4.4 %
ERYTHROCYTE [DISTWIDTH] IN BLOOD BY AUTOMATED COUNT: 12.6 % (ref 10–15)
GFR SERPL CREATININE-BSD FRML MDRD: 89 ML/MIN/{1.73_M2}
GLUCOSE SERPL-MCNC: 82 MG/DL (ref 70–99)
GLUCOSE UR STRIP-MCNC: NEGATIVE MG/DL
HCT VFR BLD AUTO: 42.5 % (ref 40–53)
HGB BLD-MCNC: 14.2 G/DL (ref 13.3–17.7)
HGB UR QL STRIP: NEGATIVE
KETONES UR STRIP-MCNC: NEGATIVE MG/DL
LEUKOCYTE ESTERASE UR QL STRIP: NEGATIVE
LYMPHOCYTES # BLD AUTO: 1.6 10E9/L (ref 0.8–5.3)
LYMPHOCYTES NFR BLD AUTO: 31.4 %
MCH RBC QN AUTO: 31.3 PG (ref 26.5–33)
MCHC RBC AUTO-ENTMCNC: 33.4 G/DL (ref 31.5–36.5)
MCV RBC AUTO: 94 FL (ref 78–100)
MONOCYTES # BLD AUTO: 0.5 10E9/L (ref 0–1.3)
MONOCYTES NFR BLD AUTO: 10 %
NEUTROPHILS # BLD AUTO: 2.8 10E9/L (ref 1.6–8.3)
NEUTROPHILS NFR BLD AUTO: 54 %
NITRATE UR QL: NEGATIVE
PH UR STRIP: 5 PH (ref 5–7)
PLATELET # BLD AUTO: 169 10E9/L (ref 150–450)
POTASSIUM SERPL-SCNC: 4 MMOL/L (ref 3.4–5.3)
PROT SERPL-MCNC: 7.8 G/DL (ref 6.8–8.8)
RBC # BLD AUTO: 4.54 10E12/L (ref 4.4–5.9)
RBC #/AREA URNS AUTO: NORMAL /HPF
SODIUM SERPL-SCNC: 138 MMOL/L (ref 133–144)
SOURCE: NORMAL
SP GR UR STRIP: 1.02 (ref 1–1.03)
UROBILINOGEN UR STRIP-ACNC: 0.2 EU/DL (ref 0.2–1)
WBC # BLD AUTO: 5.2 10E9/L (ref 4–11)
WBC #/AREA URNS AUTO: NORMAL /HPF

## 2021-05-07 PROCEDURE — 85025 COMPLETE CBC W/AUTO DIFF WBC: CPT | Performed by: PHYSICIAN ASSISTANT

## 2021-05-07 PROCEDURE — 99214 OFFICE O/P EST MOD 30 MIN: CPT | Performed by: PHYSICIAN ASSISTANT

## 2021-05-07 PROCEDURE — 80053 COMPREHEN METABOLIC PANEL: CPT | Performed by: PHYSICIAN ASSISTANT

## 2021-05-07 PROCEDURE — 81001 URINALYSIS AUTO W/SCOPE: CPT | Performed by: PHYSICIAN ASSISTANT

## 2021-05-07 PROCEDURE — 36415 COLL VENOUS BLD VENIPUNCTURE: CPT | Performed by: PHYSICIAN ASSISTANT

## 2021-05-07 ASSESSMENT — MIFFLIN-ST. JEOR: SCORE: 1467.17

## 2021-05-12 NOTE — RESULT ENCOUNTER NOTE
Dear Imtiaz    Your test results are attached, feel free to contact me via [employer]     Everything looks good on your labs.  Let me know if symptoms persist or worsen so we can evaluate further.    Mark Fitzgerald PA-C

## 2021-09-04 ENCOUNTER — HEALTH MAINTENANCE LETTER (OUTPATIENT)
Age: 61
End: 2021-09-04

## 2022-02-19 ENCOUNTER — HEALTH MAINTENANCE LETTER (OUTPATIENT)
Age: 62
End: 2022-02-19

## 2022-08-16 ENCOUNTER — MYC MEDICAL ADVICE (OUTPATIENT)
Dept: FAMILY MEDICINE | Facility: CLINIC | Age: 62
End: 2022-08-16

## 2022-08-16 DIAGNOSIS — Z12.11 SCREEN FOR COLON CANCER: Primary | ICD-10-CM

## 2022-08-16 NOTE — TELEPHONE ENCOUNTER
JS,  Please see below MyChart message and advise.  Last colonoscopy June 2012  Pended order if you approve  Also advised he schedule physical - last appt with you May 2021  Thanks,  Rain JOHN RN

## 2022-08-23 ENCOUNTER — TELEPHONE (OUTPATIENT)
Dept: GASTROENTEROLOGY | Facility: CLINIC | Age: 62
End: 2022-08-23

## 2022-08-23 NOTE — TELEPHONE ENCOUNTER
Screening Questions  BlueKIND OF PREP RedLOCATION [review exclusion criteria] GreenSEDATION TYPE      1.  Y Are you active on mychart?       2.  Charbel Fitzgerald PA-C Ordering/Referring Provider:      3. [employer]  What insurance is in the chart?    4.  Y Do you have a legal guardian or medical Power of ?              Are you able to give consent for your medical care?                (Sedation review/consideration needed)      5.   22.7  BMI  [BMI OVER 40-EXTENDED PREP]  If greater than 40 review exclusion criteria [PAC APPT IF @ [facility]]       6.   N Have you had a positive covid test in the last 90 days?      7.   Respiratory Screening :  [If yes to any of the following HOSPITAL setting only]                     N Do you use daily home oxygen?   N Do you have mod to severe Obstructive Sleep Apnea?  [OKAY @ [facility] SH PH RI]                  N Do you have Pulmonary Hypertension?                   N Do you have UNCONTROLLED asthma?         8.   N Have you had a heart or lung transplant?       9.   N Are you currently on dialysis?   [ If yes, G-PREP & HOSPITAL setting only]      10.   N  Do you have chronic kidney disease?  [ If yes, G-PREP ]     11.  N Have you had a stroke or Transient ischemic attack (TIA - aka  mini stroke ) within 6 months?   (If yes, please review exclusion criteria)     12.   N In the past 6 months, have you had any heart related issues including cardiomyopathy or heart attack?           N If yes, did it require cardiac stenting or other implantable device?       13.   N Do you have any implantable devices in your body (pacemaker, defib, LVAD)?  (If yes, please review exclusion criteria)     14.   N Do you take nitroglycerin?            N If yes, how often? n  (if yes, HOSPITAL setting ONLY)     15.   N Are you currently taking any blood thinners?           [IF YES, INFORM PATIENT TO FOLLOW UP W/ ORDERING PROVIDER FOR BRIDGING INSTRUCTIONS]      16.    N  Do you  have a diagnosis of diabetes?  [ If yes, G-PREP ]     17.   [FEMALES] N Are you currently pregnant?    N If yes, how many weeks?      18.    N  Are you taking any prescription pain medications on a routine schedule?    [ If yes, EXTENDED PREP.] [If yes, MAC]    22.  Do you take the medication Phentermine?     Yes-> Hold for 7 days before procedure.  Please consult your prescribing provider if you have questions about holding this medication.     No-> Continue to next question.     19.   N Do you have any chemical dependencies such as alcohol, street drugs, or methadone?   [If yes, MAC]     20.   N Do you have any history of post-traumatic stress syndrome, severe anxiety or history of psychosis?   [If yes, MAC]     21.   Y Do you transfer independently?       22.   N  On a regular basis do you go 3-5 days between bowel movements?  [ If yes, EXTENDED PREP.]     23.    Preferred LOCAL Pharmacy for Pre Prescription         [id number] - [place], [place] - [address] AT  [address] [facility]          Scheduling Details         Peter : Caller   (Please ask for phone number if not scheduled by patient)    Lower Endoscopy [Colonoscopy] : Type of Procedure Scheduled   GPREP Which Colonoscopy Prep was Sent?      WISE Surgeon    10/13 Date of Procedure   [facility] Location    MOD Sedation Type     Conscious Sedation- Needs  for 6 hours after the procedure  MAC/General-Needs  for 24 hours after procedure     N Pre-op Required at [facility] [place], [place] and OR for MAC sedation   (advise patient they will need a pre-op prior to procedure -)       Y Informed patient they will need an adult    Cannot take any type of public or medical transportation alone     HOME Pre-Procedure Covid test to be completed at [facility] or Externally  [[facility] PCR Testing Required]     Y  Confirmed Nurse will call to complete assessment     Additional comments:

## 2022-10-05 ENCOUNTER — TELEPHONE (OUTPATIENT)
Dept: GASTROENTEROLOGY | Facility: CLINIC | Age: 62
End: 2022-10-05

## 2022-10-05 DIAGNOSIS — Z12.11 SPECIAL SCREENING FOR MALIGNANT NEOPLASMS, COLON: Primary | ICD-10-CM

## 2022-10-05 RX ORDER — BISACODYL 5 MG/1
TABLET, DELAYED RELEASE ORAL
Qty: 4 TABLET | Refills: 0 | Status: SHIPPED | OUTPATIENT
Start: 2022-10-05

## 2022-10-05 NOTE — TELEPHONE ENCOUNTER
Attempted to contact patient regarding upcoming colonoscopy procedure on 10.13.22 for pre assessment questions. No answer.     Left message to return call to [phone] #4    Golytely prescription sent to [facility] #[id number] - [place], [place] - [address] AT  [address] [facility]    Alivia Avila RN

## 2022-10-05 NOTE — TELEPHONE ENCOUNTER
Patient scheduled for colonoscopy on 10.13.22.     Covid test scheduled ?. Discuss at home test option.     Arrival time: 0745    Facility location: [facility]    Sedation type: CS    Indication for procedure: Screening    Anticoagulations? No     Bowel prep recommendation: Standard golytely    Golytely prep sent to  pharmacy. Prep instructions sent via Clicker    Pre visit planning completed.    Alivia Avila RN

## 2022-10-06 NOTE — TELEPHONE ENCOUNTER
Pre assessment questions completed for upcoming Colonoscopy procedure scheduled on 10/13/22    COVID policy reviewed. Patient to complete rapid antigen test one to two days before their scheduled procedure. Patient to bring photo of the results when they come in for their procedure.    Reviewed procedural arrival time 0745 and facility location [facility].    Designated  policy reviewed. Instructed to have someone stay 6 hours post procedure.     Reviewed Colonoscopy prep instructions. No fiber/iron supplements or foods that contain nuts/seeds 7 days prior to procedure.     Anticoagulation/blood thinners? None    Electronic implanted devices? None    Patient verbalized understanding and had no questions or concerns at this time.    Jenelle Bajwa RN

## 2022-10-13 ENCOUNTER — HOSPITAL ENCOUNTER (OUTPATIENT)
Facility: CLINIC | Age: 62
Discharge: HOME | End: 2022-10-13
Attending: INTERNAL MEDICINE | Admitting: INTERNAL MEDICINE
Payer: COMMERCIAL

## 2022-10-13 VITALS
TEMPERATURE: 97.7 F | SYSTOLIC BLOOD PRESSURE: 102 MMHG | HEART RATE: 62 BPM | DIASTOLIC BLOOD PRESSURE: 59 MMHG | RESPIRATION RATE: 14 BRPM | OXYGEN SATURATION: 97 %

## 2022-10-13 LAB — COLONOSCOPY: NORMAL

## 2022-10-13 PROCEDURE — 45378 DIAGNOSTIC COLONOSCOPY: CPT | Performed by: INTERNAL MEDICINE

## 2022-10-13 PROCEDURE — 250N000011 HC RX IP 250 OP 636: Performed by: INTERNAL MEDICINE

## 2022-10-13 PROCEDURE — G0500 MOD SEDAT ENDO SERVICE >5YRS: HCPCS | Performed by: INTERNAL MEDICINE

## 2022-10-13 PROCEDURE — G0121 COLON CA SCRN NOT HI RSK IND: HCPCS | Performed by: INTERNAL MEDICINE

## 2022-10-13 PROCEDURE — 99153 MOD SED SAME PHYS/QHP EA: CPT

## 2022-10-13 RX ORDER — LIDOCAINE 40 MG/G
CREAM TOPICAL
Status: DISCONTINUED | OUTPATIENT
Start: 2022-10-13 | End: 2022-10-13 | Stop reason: HOSPADM

## 2022-10-13 RX ORDER — FENTANYL CITRATE 50 UG/ML
INJECTION, SOLUTION INTRAMUSCULAR; INTRAVENOUS PRN
Status: DISCONTINUED | OUTPATIENT
Start: 2022-10-13 | End: 2022-10-13 | Stop reason: HOSPADM

## 2022-10-13 RX ORDER — ONDANSETRON 2 MG/ML
4 INJECTION INTRAMUSCULAR; INTRAVENOUS
Status: DISCONTINUED | OUTPATIENT
Start: 2022-10-13 | End: 2022-10-13 | Stop reason: HOSPADM

## 2022-10-13 ASSESSMENT — ACTIVITIES OF DAILY LIVING (ADL): ADLS_ACUITY_SCORE: 35

## 2022-10-13 NOTE — H&P
Imtiaz Ramos  [id number]  male  62 year old      Reason for procedure/surgery: screening colonoscopy      Patient Active Problem List   Diagnosis     Hyperlipidemia LDL goal <160     Advanced directives, counseling/discussion       Past Surgical History:    Past Surgical History:   Procedure Laterality Date     COLONOSCOPY  2012    Procedure: COLONOSCOPY;;  Surgeon: Mag Mcdonald MD;  Location:  GI     DENTAL SURGERY      tooth implant     wisdom teeth         Past Medical History:   Past Medical History:   Diagnosis Date     Cardiovascular risk factor     Gutierrez early CVD score / with 0 all tests normal, 20 all abnormal     Hyperlipidaemia age 46    -170's     Skin cancer, basal cell 2007    resected     White coat hypertension     systolic in the 200's then low in 110's.       Social History:   Social History     Tobacco Use     Smoking status: Never     Smokeless tobacco: Never   Substance Use Topics     Alcohol use: Yes     Comment: occassionally       Family History:   Family History   Problem Relation Age of Onset     Alcohol/Drug Mother         living age 79     Hypertension Mother 72     Cancer Mother      Hypothyroidism Daughter      Arthritis Maternal Grandmother      Cerebrovascular Disease Maternal Grandmother          age 98     Cancer Maternal Grandfather          age 72-brain tumor     Alzheimer Disease Paternal Grandmother          age 86     Cerebrovascular Disease Paternal Grandfather          age 76       Allergies:   Allergies   Allergen Reactions     No Known Allergies        Active Medications:   No current outpatient medications on file.       Systemic Review:   CONSTITUTIONAL: NEGATIVE for fever, chills, change in weight  ENT/MOUTH: NEGATIVE for ear, mouth and throat problems  RESP: NEGATIVE for significant cough or SOB  CV: NEGATIVE for chest pain, palpitations or peripheral edema    Physical Examination:   Vital Signs: /87    Pulse 59   Temp 97.7  F (36.5  C) (Oral)   Resp 16   SpO2 100%   GENERAL: healthy, alert and no distress  NECK: no adenopathy, no asymmetry, masses, or scars  RESP: lungs clear to auscultation - no rales, rhonchi or wheezes  CV: regular rate and rhythm, normal S1 S2, no S3 or S4, no murmur, click or rub, no peripheral edema and peripheral pulses strong  ABDOMEN: soft, nontender, no hepatosplenomegaly, no masses and bowel sounds normal  MS: no gross musculoskeletal defects noted, no edema    ASA: 2    Mallampati Score: 2    Plan: Appropriate to proceed as scheduled.      Iggy Orlando MD  10/13/2022    PCP:  [facility] - [place], [facility]

## 2022-10-22 ENCOUNTER — HEALTH MAINTENANCE LETTER (OUTPATIENT)
Age: 62
End: 2022-10-22

## 2022-10-24 ENCOUNTER — OFFICE VISIT (OUTPATIENT)
Dept: FAMILY MEDICINE | Facility: CLINIC | Age: 62
End: 2022-10-24
Payer: COMMERCIAL

## 2022-10-24 DIAGNOSIS — D18.01 CHERRY ANGIOMA: ICD-10-CM

## 2022-10-24 DIAGNOSIS — D22.9 MULTIPLE BENIGN NEVI: ICD-10-CM

## 2022-10-24 DIAGNOSIS — Z85.828 HISTORY OF NONMELANOMA SKIN CANCER: ICD-10-CM

## 2022-10-24 DIAGNOSIS — L81.4 SOLAR LENTIGO: Primary | ICD-10-CM

## 2022-10-24 DIAGNOSIS — L82.1 SEBORRHEIC KERATOSIS: ICD-10-CM

## 2022-10-24 PROCEDURE — 99213 OFFICE O/P EST LOW 20 MIN: CPT | Performed by: PHYSICIAN ASSISTANT

## 2022-10-24 ASSESSMENT — PAIN SCALES - GENERAL: PAINLEVEL: NO PAIN (0)

## 2022-10-24 NOTE — PATIENT INSTRUCTIONS
Patient Education     Checking for Skin Cancer  You can find cancer early by checking your skin each month. There are 3 kinds of skin cancer. They are melanoma, basal cell carcinoma, and squamous cell carcinoma. Doing monthly skin checks is the best way to find new marks or skin changes. Follow the instructions below for checking your skin.   The ABCDEs of checking moles for melanoma   Check your moles or growths for signs of melanoma using ABCDE:   Asymmetry: the sides of the mole or growth don t match  Border: the edges are ragged, notched, or blurred  Color: the color within the mole or growth varies  Diameter: the mole or growth is larger than 6 mm (size of a pencil eraser)  Evolving: the size, shape, or color of the mole or growth is changing (evolving is not shown in the images below)    Checking for other types of skin cancer  Basal cell carcinoma or squamous cell carcinoma have symptoms such as:     A spot or mole that looks different from all other marks on your skin  Changes in how an area feels, such as itching, tenderness, or pain  Changes in the skin's surface, such as oozing, bleeding, or scaliness  A sore that does not heal  New swelling or redness beyond the border of a mole    Who s at risk?  Anyone can get skin cancer. But you are at greater risk if you have:   Fair skin, light-colored hair, or light-colored eyes  Many moles or abnormal moles on your skin  A history of sunburns from sunlight or tanning beds  A family history of skin cancer  A history of exposure to radiation or chemicals  A weakened immune system  If you have had skin cancer in the past, you are at risk for recurring skin cancer.   How to check your skin  Do your monthly skin checkups in front of a full-length mirror. Check all parts of your body, including your:   Head (ears, face, neck, and scalp)  Torso (front, back, and sides)  Arms (tops, undersides, upper, and lower armpits)  Hands (palms, backs, and fingers, including  under the nails)  Buttocks and genitals  Legs (front, back, and sides)  Feet (tops, soles, toes, including under the nails, and between toes)  If you have a lot of moles, take digital photos of them each month. Make sure to take photos both up close and from a distance. These can help you see if any moles change over time.   Most skin changes are not cancer. But if you see any changes in your skin, call your doctor right away. Only he or she can diagnose a problem. If you have skin cancer, seeing your doctor can be the first step toward getting the treatment that could save your life.   [employer] last reviewed this educational content on 4/1/2019 2000-2020 [employer]. [address] [place] [address], [place] [address]. All rights reserved. This information is not intended as a substitute for professional medical care. Always follow your healthcare professional's instructions.       When should I call my doctor?  If you are worsening or not improving, please, contact us or seek urgent care as noted below.     Who should I call with questions (adults)?  [facility] (adult and pediatric): [phone]  [facility] (adult): [phone]  For urgent needs outside of business hours call the [facility] at [phone] and ask for the dermatology resident on call to be paged  If this is a medical emergency and you are unable to reach an ER, Call [phone]    Who should I call with questions (pediatric)?  [facility]- Pediatric Dermatology  Dr. Shirin Kumar, Dr. Juan Taveras, Dr. Cindy Mcelroy, TILA Mendez, Dr. Lois Belcher, Dr. Coreen Almazan & Dr. Tristin Joyce  Non-urgent nurse triage line; [phone]- Gricelda and Cheryle HERRERA Care Coordinatormichelle Hinkle (/Complex ) [phone]    If you need a prescription refill, please contact your pharmacy. Refills are approved or denied by our  Physicians during normal business hours, Monday through Fridays  Per office policy, refills will not be granted if you have not been seen within the past year (or sooner depending on your child's condition)    Scheduling Information:  Pediatric Appointment Scheduling and Call Center [phone]  Radiology Scheduling- [phone]  Sedation Unit Scheduling- [phone]  [place] Scheduling- General [phone]; Pediatric Dermatology [phone]  Main  Services: [phone]  Malay: [phone]  Norwegian: [phone]  Hmong/Trinidadian/Chinese: [phone]  Preadmission Nursing Department Fax Number: [phone] (Fax all pre-operative paperwork to this number)    For urgent matters arising during evenings, weekends, or holidays that cannot wait for normal business hours please call [phone] and ask for the dermatology resident on call to be paged.

## 2022-10-24 NOTE — PROGRESS NOTES
[facility] Dermatology Note  Encounter Date: Oct 24, 2022  Office Visit     Dermatology Problem List:  1. History of NMSC  - BCC, right anterior chest s/p excision 2006  2. Family history of melanoma (father)    Last FBSE: 10/24/2022  ____________________________________________    Assessment & Plan:    # Benign lesions: multiple benign nevi, solar lentigos, seborrheic keratoses, cherry angiomas. Explained to patient benign nature of lesion. No treatment is necessary at this time unless the lesion changes or becomes symptomatic.   - ABCDs of melanoma were discussed and self skin checks were advised.  - Sun precaution was advised including the use of sun screens of SPF 30 or higher, sun protective clothing, and avoidance of tanning beds.    # History of NMSC. No evidence of recurrence.  # Family history of melanoma  - Continue annual skin exams.  - Signs and Symptoms of non-melanoma skin cancer and ABCDEs of melanoma reviewed with patient. Patient encouraged to perform monthly self skin exams and educated on how to perform them. UV precautions reviewed with patient. Patient was asked about new or changing moles/lesions on body.   - Sunscreen: Apply 20 minutes prior to going outdoors and reapply every two hours, when wet or sweating. We recommend using an SPF 30 or higher, and to use one that is water resistant.       Procedures Performed:   None    Follow-up: 1 year(s) in-person, or earlier for new or changing lesions    Staff and Scribe:     Scribe Disclosure:  I, Bethel Yates, am serving as a scribe to document services personally performed by Stacy Corral PA-C based on data collection and the provider's statements to me.   Provider Disclosure:   The documentation recorded by the scribe accurately reflects the services I personally performed and the decisions made by me.    All risks, benefits and alternatives were discussed with patient.  Patient is in agreement and understands the assessment  and plan.  All questions were answered.    Stacy Corral PA-C, MPAS  [facility]: Phone: [phone], Fax: [phone]  [facility]: Phone: [phone],  Fax: [phone]  [facility]: Phone: [phone], Fax: [phone]  ____________________________________________    CC: Derm Problem ([facility]SC)    HPI:  Mr. Imtiaz Ramos is a(n) 62 year old male who presents today as a return patient for a FBSE. Last seen in dermatology virtually by Dr. Toro on 6/25/2020, at which time a a NUB was examined via digital photo; patient was urged to come to present in-person for further evaluation.    Today, the patient endorses a spot of concern on his right shoulder. He denies any major medical changes within the last year. He uses hats a sun protection. He does not spend much time outside aside from early morning runs.    Patient is otherwise feeling well, without additional skin concerns.    Labs Reviewed:  N/A    Physical Exam:  Vitals: There were no vitals taken for this visit.  SKIN: Total skin excluding the undergarment areas was performed. The exam included the head/face, neck, both arms, chest, back, abdomen, both legs, digits and/or nails.   - Romero II  - There are dome shaped bright red papules on the trunk and extremities.   - Multiple regular brown pigmented macules and papules are identified on the trunk and extremities.   - Scattered brown macules on sun exposed areas.  - There are waxy stuck on tan to brown papules on the trunk and extremities.   - No other lesions of concern on areas examined.     Medications:  Current Outpatient Medications   Medication     bisacodyl (DULCOLAX) 5 MG EC tablet     polyethylene glycol (GOLYTELY) 236 g suspension     No current facility-administered medications for this visit.      Past Medical History:   Patient Active Problem List   Diagnosis     Hyperlipidemia  LDL goal <160     Advanced directives, counseling/discussion     Past Medical History:   Diagnosis Date     Cardiovascular risk factor 2007    [facility] early CVD score 1/20 with 0 all tests normal, 20 all abnormal     Hyperlipidaemia age 46    -170's     Skin cancer, basal cell 2007    resected     White coat hypertension     systolic in the 200's then low in 110's.

## 2022-10-24 NOTE — LETTER
10/24/2022         RE: Imtiaz Ramos  [id number]  [facility] [id number]        Dear Colleague,    Thank you for referring your patient, Imtiaz Ramos, to the [facility]. Please see a copy of my visit note below.    [facility] Dermatology Note  Encounter Date: Oct 24, 2022  Office Visit     Dermatology Problem List:  1. History of NMSC  - BCC, right anterior chest s/p excision 2006  2. Family history of melanoma (father)    Last FBSE: 10/24/2022  ____________________________________________    Assessment & Plan:    # Benign lesions: multiple benign nevi, solar lentigos, seborrheic keratoses, cherry angiomas. Explained to patient benign nature of lesion. No treatment is necessary at this time unless the lesion changes or becomes symptomatic.   - ABCDs of melanoma were discussed and self skin checks were advised.  - Sun precaution was advised including the use of sun screens of SPF 30 or higher, sun protective clothing, and avoidance of tanning beds.    # History of NMSC. No evidence of recurrence.  # Family history of melanoma  - Continue annual skin exams.  - Signs and Symptoms of non-melanoma skin cancer and ABCDEs of melanoma reviewed with patient. Patient encouraged to perform monthly self skin exams and educated on how to perform them. UV precautions reviewed with patient. Patient was asked about new or changing moles/lesions on body.   - Sunscreen: Apply 20 minutes prior to going outdoors and reapply every two hours, when wet or sweating. We recommend using an SPF 30 or higher, and to use one that is water resistant.       Procedures Performed:   None    Follow-up: 1 year(s) in-person, or earlier for new or changing lesions    Staff and Scribe:     Scribe Disclosure:  I, Bethel Yates, am serving as a scribe to document services personally performed by Stacy Corral PA-C based on data collection and the provider's statements to me.   Provider  Disclosure:   The documentation recorded by the scribe accurately reflects the services I personally performed and the decisions made by me.    All risks, benefits and alternatives were discussed with patient.  Patient is in agreement and understands the assessment and plan.  All questions were answered.    Stacy Corral PA-C, MPAS  [facility]: Phone: [phone], Fax: [phone]  [facility]: Phone: [phone],  Fax: [phone]  [facility]: Phone: [phone], Fax: [phone]  ____________________________________________    CC: Derm Problem (FBS)    HPI:  Mr. Imtiaz Ramos is a(n) 62 year old male who presents today as a return patient for a FBSE. Last seen in dermatology virtually by Dr. Toro on 6/25/2020, at which time a a NUB was examined via digital photo; patient was urged to come to present in-person for further evaluation.    Today, the patient endorses a spot of concern on his right shoulder. He denies any major medical changes within the last year. He uses hats a sun protection. He does not spend much time outside aside from early morning runs.    Patient is otherwise feeling well, without additional skin concerns.    Labs Reviewed:  N/A    Physical Exam:  Vitals: There were no vitals taken for this visit.  SKIN: Total skin excluding the undergarment areas was performed. The exam included the head/face, neck, both arms, chest, back, abdomen, both legs, digits and/or nails.   - Romero II  - There are dome shaped bright red papules on the trunk and extremities.   - Multiple regular brown pigmented macules and papules are identified on the trunk and extremities.   - Scattered brown macules on sun exposed areas.  - There are waxy stuck on tan to brown papules on the trunk and extremities.   - No other lesions of concern on areas examined.     Medications:  Current Outpatient  Medications   Medication     bisacodyl (DULCOLAX) 5 MG EC tablet     polyethylene glycol (GOLYTELY) 236 g suspension     No current facility-administered medications for this visit.      Past Medical History:   Patient Active Problem List   Diagnosis     Hyperlipidemia LDL goal <160     Advanced directives, counseling/discussion     Past Medical History:   Diagnosis Date     Cardiovascular risk factor 2007    [facility] early CVD score 1/20 with 0 all tests normal, 20 all abnormal     Hyperlipidaemia age 46    -170's     Skin cancer, basal cell 2007    resected     White coat hypertension     systolic in the 200's then low in 110's.            Again, thank you for allowing me to participate in the care of your patient.        Sincerely,        Stacy Corral PA-C

## 2023-04-01 ENCOUNTER — HEALTH MAINTENANCE LETTER (OUTPATIENT)
Age: 63
End: 2023-04-01

## 2024-06-02 ENCOUNTER — HEALTH MAINTENANCE LETTER (OUTPATIENT)
Age: 64
End: 2024-06-02

## 2025-06-14 ENCOUNTER — HEALTH MAINTENANCE LETTER (OUTPATIENT)
Age: 65
End: 2025-06-14

## (undated) RX ORDER — FENTANYL CITRATE 50 UG/ML
INJECTION, SOLUTION INTRAMUSCULAR; INTRAVENOUS
Status: DISPENSED
Start: 2022-10-13